# Patient Record
Sex: FEMALE | Race: BLACK OR AFRICAN AMERICAN | HISPANIC OR LATINO | URBAN - METROPOLITAN AREA
[De-identification: names, ages, dates, MRNs, and addresses within clinical notes are randomized per-mention and may not be internally consistent; named-entity substitution may affect disease eponyms.]

---

## 2022-11-02 ENCOUNTER — EMERGENCY (EMERGENCY)
Facility: HOSPITAL | Age: 32
LOS: 1 days | Discharge: HOME | End: 2022-11-02
Admitting: STUDENT IN AN ORGANIZED HEALTH CARE EDUCATION/TRAINING PROGRAM
Payer: SELF-PAY

## 2022-11-02 VITALS
TEMPERATURE: 98 F | SYSTOLIC BLOOD PRESSURE: 122 MMHG | HEIGHT: 64 IN | WEIGHT: 199.96 LBS | HEART RATE: 66 BPM | OXYGEN SATURATION: 99 % | DIASTOLIC BLOOD PRESSURE: 73 MMHG | RESPIRATION RATE: 18 BRPM

## 2022-11-02 PROCEDURE — 99284 EMERGENCY DEPT VISIT MOD MDM: CPT

## 2022-11-02 PROCEDURE — 99283 EMERGENCY DEPT VISIT LOW MDM: CPT | Mod: 25

## 2022-11-02 PROCEDURE — 16020 DRESS/DEBRID P-THICK BURN S: CPT

## 2022-11-02 RX ORDER — OXYCODONE AND ACETAMINOPHEN 5; 325 MG/1; MG/1
1 TABLET ORAL ONCE
Refills: 0 | Status: DISCONTINUED | OUTPATIENT
Start: 2022-11-02 | End: 2022-11-02

## 2022-11-02 RX ADMIN — OXYCODONE AND ACETAMINOPHEN 1 TABLET(S): 5; 325 TABLET ORAL at 10:37

## 2022-11-02 RX ADMIN — Medication 1 APPLICATION(S): at 11:02
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
## 2022-11-02 NOTE — ED PROVIDER NOTE - NSFOLLOWUPINSTRUCTIONS_ED_ALL_ED_FT
Please perform wound care daily.   (1) Remove bandage  (2) Wash hand gently with soapy water  (3) Pat dry  (4) Apply thin layer of silvadene ointment  (5) Apply xeroform gauze (yellow gauze)  (6) Apply non-stick gauze  (7) Apply bulky gauze roll    DO NOT pick or pop any blisters that form  Return to the Emergency Department if you develop fever>100.4F, increased hand swelling, yellow or green drainage from wound, pain that is not controlled by ibuprofen and percocet.    Take ibuprofen 600mg up to three times daily for pain. Take one tab of percocet up to three times daily for severe pain. Do not drive while taking this medication.    [facility]  For an appointment:  [phone]  [email or web address]
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
## 2022-11-02 NOTE — ED PROVIDER NOTE - NS ED ROS FT
Constitutional: No fever. No chills.  Eyes: No redness. No discharge. No vision change.   ENT: No sore throat. No ear pain.  Cardiovascular: No chest pain. No leg swelling.  Respiratory: No cough. No shortness of breath.  GI: No abdominal pain. No vomiting. No diarrhea.   MSK: No joint pain. No back pain.   Skin: No rash. No abrasions. +burn  Neuro: No numbness. No weakness.   Psych: No known mental health issues.
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
## 2022-11-04 DIAGNOSIS — T23.252A BURN OF SECOND DEGREE OF LEFT PALM, INITIAL ENCOUNTER: ICD-10-CM

## 2022-11-04 DIAGNOSIS — T31.0 BURNS INVOLVING LESS THAN 10% OF BODY SURFACE: ICD-10-CM
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
## 2022-11-04 DIAGNOSIS — T23.052A BURN OF UNSPECIFIED DEGREE OF LEFT PALM, INITIAL ENCOUNTER: ICD-10-CM
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
